# Patient Record
Sex: FEMALE | Race: WHITE
[De-identification: names, ages, dates, MRNs, and addresses within clinical notes are randomized per-mention and may not be internally consistent; named-entity substitution may affect disease eponyms.]

---

## 2017-03-23 NOTE — PCM.PNPP
- General Info


Date of Service: 17


Functional Status: Reports: pain controlled, tolerating diet, ambulating, 

urinating





- Review of Systems


General: Denies: Fever, Weakness


Pulmonary: Denies: shortness of breath


Cardiovascular: Denies: Chest Pain, Lightheadedness


Gastrointestinal: Denies: Abdominal pain, Nausea, Vomiting


Genitourinary: Denies: dysuria, flank pain


Skin: Reports: no symptoms


Psychiatric: Reports: no symptoms





- General Info


Date of Service: 17





- Patient Data


Vital Signs - most recent: 


 Last Vital Signs











Temp  36.9 C   17 07:55


 


Pulse  94   17 07:55


 


Resp  16   17 07:55


 


BP  93/52 L  17 07:55


 


Pulse Ox  96   17 07:55











Weight - most recent: 93.44 kg


Lab Results - last 24 hrs: 


 Laboratory Results - last 24 hr











  17 Range/Units





  04:35 


 


Hgb  10.7 L  (12.0-16.0)  g/dL


 


Hct  32.5 L  (36.0-46.0)  %











Med Orders - Current: 


 Current Medications





Acetaminophen (Tylenol Extra Strength)  500 mg PO Q4H PRN


   PRN Reason: Pain


Acetaminophen (Tylenol Extra Strength)  1,000 mg PO Q4H PRN


   PRN Reason: Pain


Benzocaine/Menthol (Dermoplast Pain Relief 20%-0.5% Spray)  78 gm TOP 

ASDIRECTED PRN


   PRN Reason: Perineal Comfort Measure


   Last Admin: 17 11:05 Dose:  1 can


Bisacodyl (Dulcolax)  10 mg RECTAL .ONCE PRN


   PRN Reason: Constipation


Carboprost Tromethamine (Hemabate Ds)  250 mcg IM ASDIRECTED PRN


   PRN Reason: Post Partum Hemorrhage


Docusate Sodium (Colace)  100 mg PO BID PRN


   PRN Reason: Constipation


   Last Admin: 17 11:06 Dose:  100 mg


Emollient Ointment (Lansinoh Hpa)  0 gm TOP ASDIRECTED PRN


   PRN Reason: Sore Nipples


Lactated Ringer's (Ringers, Lactated)  1,000 mls @ 150 mls/hr IV ASDIRECTED VANNA


   Last Admin: 17 08:43 Dose:  150 mls/hr


Oxytocin/Lactated Ringer's (Pitocin In Lr 30 Units/500 Ml)  30 unit in 500 mls 

@ 2 mls/hr IV TITRATE VANNA; 2 MUNITS/MIN


   PRN Reason: Protocol


   Last Titration: 17 09:11 Dose:  999 mls/hr


Ibuprofen (Motrin)  400 mg PO Q4H PRN


   PRN Reason: Pain


Ibuprofen (Motrin)  800 mg PO Q6H PRN


   PRN Reason: Pain


   Last Admin: 17 17:29 Dose:  800 mg


Methylergonovine Maleate (Methergine)  0.2 mg IM ASDIRECTED PRN


   PRN Reason: Post Partum Hemorrhage


Oxycodone HCl (Oxycodone)  5 mg PO Q2H PRN


   PRN Reason: Pain


Sodium Chloride (Saline Flush)  2.5 ml FLUSH ASDIRECTED PRN


   PRN Reason: Keep Vein Open


Sterile Water (Sterile Water For Irrigation)  1,000 ml IRR ASDIRECTED PRN


   PRN Reason: delivery


Witch Hazel (Tucks)  1 pad TOP ASDIRECTED PRN


   PRN Reason: comfort care


   Last Admin: 17 11:05 Dose:  1 tub





Discontinued Medications





Butorphanol Tartrate (Stadol)  1 mg IVPUSH Q1H PRN


   PRN Reason: Pain


Ampicillin Sodium 2 gm/ Sodium (Chloride)  100 mls @ 200 mls/hr IV ONETIME ONE


   Stop: 17 05:29


   Last Admin: 17 05:41 Dose:  200 mls/hr


Ampicillin Sodium 1 gm/ Sodium (Chloride)  50 mls @ 100 mls/hr IV Q4H VANNA


   Last Admin: 17 09:43 Dose:  Not Given


Lidocaine HCl (Xylocaine 1%)  50 ml INJECT .ONCE PRN


   PRN Reason: Laceration repair


Misoprostol (Cytotec)  200 mcg PO .ONCE PRN


   PRN Reason: Post Partum Hemorrhage


Sodium Chloride (Saline Flush)  10 ml FLUSH ASDIRECTED PRN


   PRN Reason: Keep Vein Open


Terbutaline Sulfate (Brethine)  0.25 mg SUBCUT ASDIRECTED PRN


   PRN Reason: Tacysystole











- Infant Interaction


Infant Feeding:  Infant; Nursed Well


Support Person: 





- Postpartum Recovery Exam


Fundal Tone: Firm


Fundal Level: At Umbilicus


Fundal Placement: Midline


Lochia Amount: Small


Lochia Color: Rubra/Red


Perineum Description: Other (see below)


Other Perinuem Description: Second degree perenial tear,repaired


Episiotomy/Laceration: Approximated


Bladder Status: Voiding


Urinary Elimination: Voided





- Exam


General: alert, oriented


Lungs: Normal respiratory effort


Cardiovascular: Regular Rate, Regular Rhythm


Abdomen: bowel sounds present, soft.  No: CVA tenderness


Extremities: no calf tenderness


Wound/Incisions: healing well


Psy/Mental Status: alert, normal affect





- Problem List & Annotations


(1) Vaginal delivery


SNOMED Code(s): 029719307


   Code(s): O80 - ENCOUNTER FOR FULL-TERM UNCOMPLICATED DELIVERY   Status: 

Acute   Current Visit: Yes   





- Problem List Review


Problem List Initiated/Reviewed/Updated: Yes





- My Orders


Last 24 Hours: 


My Active Orders





17 09:33


Patient Status [ADT] Routine 


May Shower [RC] ASDIRECTED 


Up ad Nancy [RC] ASDIRECTED 


Vital Signs [RC] PER UNIT ROUTINE 


Acetaminophen [Tylenol Extra Strength]   1,000 mg PO Q4H PRN 


Acetaminophen [Tylenol Extra Strength]   500 mg PO Q4H PRN 


Benzocaine/Menthol [Dermoplast Pain Relief 20%-0.5% Spray]   78 gm TOP 

ASDIRECTED PRN 


Bisacodyl [Dulcolax]   10 mg RECTAL .ONCE PRN 


Docusate Sodium [Colace]   100 mg PO BID PRN 


Ibuprofen [Motrin]   400 mg PO Q4H PRN 


Ibuprofen [Motrin]   800 mg PO Q6H PRN 


Lanolin [Lansinoh HPA]   See Dose Instructions  TOP ASDIRECTED PRN 


Witch Hazel [Tucks]   1 pad TOP ASDIRECTED PRN 


oxyCODONE   5 mg PO Q2H PRN 


Assess Lochia [WOMSER] Per Unit Routine 


Assess Uterine Involution [WOMSER] Per Unit Routine 


Peripheral IV Discontinue [OM.PC] Routine 





17 09:34


Ice Therapy [OM.PC] Per Unit Routine 


Perineal Care [OM.PC] Per Unit Routine 


Sitz Bath [OM.PC] Per Unit Routine 





17 09:09


Ready for Discharge [RC] PER UNIT ROUTINE 














- Assessment


Assessment:: 





PPD 1 status post /2nd MLL repaired





- Plan


Plan:: 


Doing well overall, would like to go home today.  Infection and bleeding 

warnings reviewed.  Discharge to home.  Discharge instructions reviewed. Follow 

up at [facility] 6 weeks.

## 2017-03-27 NOTE — OR
SURGEON:

Aretha De Jesus M.D.

 

DATE OF PROCEDURE:  2017

 

PREOPERATIVE DIAGNOSES:

1. A 39 and 6 weeks intrauterine pregnancy.

2. Induction of labor.

 

POSTOPERATIVE DIAGNOSES:

1. A 39 and 6 weeks intrauterine pregnancy.

2. Induction of labor.

 

PROCEDURE:

Spontaneous vaginal delivery, second-degree midline laceration repaired.

 

ESTIMATED BLOOD LOSS:

350 mL.

 

ANESTHESIA:

Local.

 

COMPLICATIONS:

None.

 

FINDINGS:

Term male.  Apgar score 8 at 1 minute, 9 at 5 minutes.  Weight 3380 g.

Spontaneous delivery, intact placenta, 3-vessel cord.

 

DISPOSITION:

Infant to  nursery, mom in LDRP, stable.

 

PROCEDURE DETAILS:

The patient is a 26-year-old, G2, P0-1-0-1, at 39 and 6 weeks' gestational age,

who presents today for scheduled induction of labor due to term gestation lives

remote from the hospital.  Her previous pregnancy was complicated by 

delivery at 28 weeks.  This pregnancy, she has received weekly progesterone

injections up until 36 weeks.

 

On the morning of admission, the patient was initially found to be at 2 cm.

GBBS has been found in her urine culture.  Therefore, she was admitted, routine

labs were drawn, and initiated on ampicillin prophylaxis.

 

The patient responded readily to the Pitocin and shortly after 8:00 a.m., was

found to be increasingly uncomfortable and quite rapidly was already 6 cm.  She

continued to progress rapidly and had spontaneous rupture of membranes and

quickly progressed to complete.  Thereafter, I was called for delivery.  Upon my

arrival, the patient was placed in modified dorsal lithotomy position, prepped

and draped in usual aseptic manner.  Fetal station was already +4.  She pushed

readily and was able to deliver infant's head atraumatically, spontaneously,

followed by anterior shoulder, posterior shoulder, and main body without

difficulty.  Infant's oropharynx and nares were bulb suctioned.  Cord was

clamped x2 and cut.  Infant was handed off to mother with attending nursing

staff at her side.  Cord arterial, cord venous, cord blood samples were

obtained.  Light suprapubic pressure was applied while the placenta was

delivered spontaneously intact.  Vigorous fundal uterine massage was then

applied while 20 units of Picotin was delivered in 1 L of IV fluid.  Upon

inspection of cervix, vaginal sidewalls and perineum, there was found to be a

second-degree midline laceration present.  This was repaired using 3-0 Vicryl in

the usual fashion after prepping the area with approximately 7 mL of 1%

lidocaine.

 

The patient tolerated the repair well.  Upon inspection remainder of cervix,

vaginaol sidewalls and perineum, they were found to be intact.  Uterus remained

firm.  Hemostasis evident.  The patient will remain in LDRP in stable condition.

Infant in  nursery.  Sponge and needle counts correct x2.

 

 

SLAVA / NAHUN

DD:  2017 09:46:42

DT:  2017 13:12:57

Job #:  [id number]

MTDD

## 2020-05-25 NOTE — PCM.PREANE
Preanesthetic Assessment





- Procedure


Proposed Procedure: 





labor epidural








- Anesthesia/Transfusion/Family Hx


Anesthesia History: Prior Anesthesia Without Reaction


Family History of Anesthesia Reaction: No





- Review of Systems


General: No Symptoms


Pulmonary: No Symptoms


Cardiovascular: No Symptoms


Gastrointestinal: No Symptoms


Neurological: No Symptoms


Other: Reports: None





- Physical Assessment


Height: 5 ft 4 in


Weight: 95.254 kg


ASA Class: 2


Mental Status: Alert & Oriented x3


Dentition: Reports: Normal Dentition


Thyro-Mental Finger Breadths: 3


Mouth Opening Finger Breadths: 3


ROM/Head Extension: Full


Lungs: Clear to Auscultation, Normal Respiratory Effort


Cardiovascular: Regular Rate, Regular Rhythm





- Lab


Values: 





 Laboratory Last Values











WBC  10.63 K/uL (4.0-11.0)   05/25/20  22:10    


 


RBC  4.19 M/uL (4.30-5.90)  L  05/25/20  22:10    


 


Hgb  12.3 g/dL (12.0-16.0)   05/25/20  22:10    


 


Hct  37.7 % (36.0-46.0)   05/25/20  22:10    


 


MCV  90.0 fL (80.0-98.0)   05/25/20  22:10    


 


MCH  29.4 pg (27.0-32.0)   05/25/20  22:10    


 


MCHC  32.6 g/dL (31.0-37.0)   05/25/20  22:10    


 


RDW Std Deviation  48.0 fl (28.0-62.0)   05/25/20  22:10    


 


RDW Coeff of Sandro  15 % (11.0-15.0)   05/25/20  22:10    


 


Plt Count  178 K/uL (150-400)   05/25/20  22:10    


 


MPV  11.90 fL (7.40-12.00)   05/25/20  22:10    


 


Nucleated RBC %  0.0 /100WBC  05/25/20  22:10    


 


Nucleated RBCs #  0 K/uL  05/25/20  22:10    














- Allergies


Allergies/Adverse Reactions: 


 Allergies











Allergy/AdvReac Type Severity Reaction Status Date / Time


 


No Known Allergies Allergy   Verified 05/25/20 21:54














- Blood


Product(s) Available: None





- Acknowledgements


Anesthesia Type Planned: Epidural


Pt an Appropriate Candidate for the Planned Anesthesia: Yes


Alternatives and Risks of Anesthesia Discussed w Pt/Guardian: Yes


Pt/Guardian Understands and Agrees with Anesthesia Plan: Yes





PreAnesthesia Questionnaire


OB/GYN History: Reports: Pregnancy





- Past Surgical History


HEENT Surgical History: Reports: Oral Surgery





- HOME MEDS


Home Medications: 


 Home Meds





Pnv No.95/Ferrous Fum/Folic AC [Prenatal Tablet] 1 tab PO DAILY 03/22/17 [

History]











- CURRENT (IN HOUSE) MEDS


Current Meds: 





 Current Medications





Butorphanol Tartrate (Stadol)  1 mg IVPUSH Q1H PRN


   PRN Reason: Pain


Carboprost Tromethamine (Hemabate Ds)  250 mcg IM ASDIRECTED PRN


   PRN Reason: Post Partum Hemorrhage


Ampicillin Sodium 1 gm/ Sodium (Chloride)  50 mls @ 100 mls/hr IV Q4H VANNA


Lactated Ringer's (Ringers, Lactated)  1,000 mls @ 150 mls/hr IV ASDIRECTED VANNA


   Last Admin: 05/25/20 22:59 Dose:  999 mls/hr


Oxytocin/Sodium Chloride (Oxytocin 30 Unit/500 Ml-Ns)  30 unit in 500 mls @ 999 

mls/hr IV TITRATE [facility]


Tranexamic Acid 1,000 mg/ (Sodium Chloride)  110 mls @ 660 mls/hr IV ONETIME PRN


   PRN Reason: Bleeding


Lidocaine HCl (Xylocaine 1%)  50 ml INJECT ONETIME PRN


   PRN Reason: Laceration repair


Methylergonovine Maleate (Methergine)  0.2 mg IM ASDIRECTED PRN


   PRN Reason: Post Partum Hemorrhage


Misoprostol (Cytotec)  200 mcg PO ONETIME PRN


   PRN Reason: Post Partum Hemorrhage


Nalbuphine HCl (Nubain)  10 mg IVPUSH Q1H PRN


   PRN Reason: Pain (severe 7-10)


Ondansetron HCl (Zofran)  4 mg IVPUSH Q6H PRN


   PRN Reason: Nausea/Vomiting


Sodium Chloride (Saline Flush)  10 ml FLUSH ASDIRECTED PRN


   PRN Reason: Keep Vein Open


Sodium Chloride (Saline Flush)  2.5 ml FLUSH ASDIRECTED PRN


   PRN Reason: Keep Vein Open


Sodium Chloride (Normal Saline)  10 ml IV ASDIRECTED PRN


   PRN Reason: IV Use


Sterile Water (Sterile Water For Irrigation)  1,000 ml IRR ASDIRECTED PRN


   PRN Reason: delivery





Discontinued Medications





Ampicillin Sodium 2 gm/ Sodium (Chloride)  100 mls @ 200 mls/hr IV ONETIME ONE


   Stop: 05/25/20 22:59


   Last Admin: 05/25/20 22:19 Dose:  200 mls/hr


Ropivacaine (Naropin 0.2%) Confirm Administered Dose 20 ml .ROUTE .[facility]-MED ONE


   Stop: 05/25/20 23:01

## 2020-05-26 NOTE — PCM.OPNOTE
- General Post-Op/Procedure Note


Date of Surgery/Procedure: 20


Operative Procedure(s): /IP


Findings: 





Viable male APGARs 7, 9 weight 3580 gm. Spontaneous delivery intact placenta 

with 3V cord


Pre Op Diagnosis: 39/5 week IUP.  SROM.  GBBS +


Post-Op Diagnosis: Same


Anesthesia Technique: Epidural


Primary Surgeon: Aretha De Jesus


EBL in mLs: 250


Complications: None known


Condition: Stable


Free Text/Narrative:: 





Dictation [id number]

## 2020-05-26 NOTE — OR
SURGEON:

Aretha De Jesus M.D.

 

DATE OF PROCEDURE:  2020

 

PREOPERATIVE DIAGNOSES:

1. 39 and 5 weeks' intrauterine pregnancy.

2. Spontaneous rupture of membranes.

3. Group B Streptococcus positive.

 

POSTOPERATIVE DIAGNOSES:

1. 39 and 5 weeks' intrauterine pregnancy.

2. Spontaneous rupture of membranes.

3. Group B Streptococcus positive.

 

PROCEDURE:

Spontaneous vaginal delivery, intact perineum.

 

PRIMARY SURGEON:

Aretha De Jesus M.D.

 

ANESTHESIA:

Epidural.

 

ESTIMATED BLOOD LOSS:

250 mL.

 

COMPLICATIONS:

None known.

 

FINDINGS:

Viable male Apgar scores 8 at one minute and 9 at five minutes.  Weight of 3580

g.  Spontaneous delivery, intact placenta, 3-vessel cord.

 

DISPOSITION:

Infant to  nursery.  Mom in LDRP.

 

PROCEDURE DETAILS:

Leora is a 29-year-old, G3, P2 at 39 and 4 weeks' gestational age, who

presented on the evening of 2020 with a leaking fluid, clear.  On initial

examination, she was found to be grossly ruptured.  She is known group B beta

strep positive.  Therefore, she was admitted.  Routine labs were drawn.  IV

hydration was initiated, and group B beta strep prophylaxis was initiated.

 

The fetal heart tones were category 1.  The patient was initially found to be 4

to 5 cm.  After being hydrated, she underwent regional anesthesia in the form of

epidural, became more comfortable, and she continued to progress through the

early morning hours.  Shortly before 5 a.m., the patient was found to be

complete, 100% effaced, +2 station, was called for delivery.  Upon my arrival,

the patient was placed in modified dorsal lithotomy position, was prepped and

draped in the usual aseptic manner.  Began pushing efforts.  With the next 2

contractions, she was able to deliver the infant's head atraumatically

spontaneously, followed by anterior shoulder, posterior shoulder, and remainder

of the body without difficulty.  The infant was handed off to his mother with

attending nursing staff at her side.  After delayed cord was clamped x2 and cut,

cord arterial, cord venous, cord blood sampling were obtained.  Light pressure

was applied while the placenta delivered spontaneously intact.  Vigorous fundal

uterine massage was then applied while 30 units Pitocin was delivered in 500 mL

of fluid.  Upon inspection of cervix, vaginal sidewall, and perineum, these were

found to be intact.  The uterus remained firm.  Sponge count and instrument

count were correct.  The patient remained in LDRP.  Infant to  nursery.

 

 

SLAVA / NAHUN

DD:  2020 05:31:03

DT:  2020 06:14:57

Job #:  [id number]

## 2020-05-26 NOTE — PCM48HPAN
Post Anesthesia Note





- EVALUATION WITHIN 48HRS OF ANESTHETIC


Vital Signs in Normal Range: Yes


Patient Participated in Evaluation: Yes


Respiratory Function Stable: Yes


Airway Patent: Yes


Cardiovascular Function Stable: Yes


Hydration Status Stable: Yes


Pain Control Satisfactory: Yes


Nausea and Vomiting Control Satisfactory: Yes


Mental Status Recovered: Yes


Vital Signs: 


 Last Vital Signs











Temp  36.2 C   05/26/20 08:00


 


Pulse  92   05/26/20 08:00


 


Resp  16   05/26/20 08:00


 


BP  107/78   05/26/20 08:00


 


Pulse Ox  97   05/26/20 08:00














- COMMENTS/OBSERVATIONS


Free Text/Narrative:: 





No anesthesia concerns or complications noted.

## 2020-05-27 NOTE — PCM.PNPP
- General Info


Date of Service: 20


Functional Status: Reports: Pain Controlled, Tolerating Diet, Ambulating, 

Urinating





- Review of Systems


General: Reports: Fatigue.  Denies: Fever, Weakness


Pulmonary: Denies: Shortness of Breath


Cardiovascular: Denies: Chest Pain, Palpitations, Lightheadedness


Gastrointestinal: Denies: Abdominal Pain, Nausea, Vomiting


Genitourinary: Reports: No Symptoms


Musculoskeletal: Reports: No Symptoms


Skin: Reports: No Symptoms


Neurological: Reports: No Symptoms


Psychiatric: Reports: No Symptoms





- General Info


Date of Service: 20





- Patient Data


Vital Signs - Most Recent: 


 Last Vital Signs











Temp  36.3 C   20 20:00


 


Pulse  100   20 04:15


 


Resp  18   20 04:15


 


BP  129/75   20 04:15


 


Pulse Ox  97   20 04:15











Weight - Most Recent: 95.254 kg


Lab Results - Last 24 Hours: 


 Laboratory Results - last 24 hr











  20 Range/Units





  06:10 


 


Hgb  11.1 L  (12.0-16.0)  g/dL


 


Hct  34.6 L  (36.0-46.0)  %











Med Orders - Current: 


 Current Medications





Acetaminophen (Tylenol Extra Strength)  500 mg PO Q4H PRN


   PRN Reason: Pain


Acetaminophen (Tylenol Extra Strength)  1,000 mg PO Q4H PRN


   PRN Reason: Pain


Al Hydroxide/Mg Hydroxide (Mag-Al Plus)  30 ml PO Q8H PRN


   PRN Reason: Heartburn


Benzocaine/Menthol (Dermoplast Pain Relief 20%-0.5% Spray)  78 gm TOP 

ASDIRECTED PRN


   PRN Reason: Perineal Comfort Measure


Bisacodyl (Dulcolax)  10 mg RECTAL ONETIME PRN


   PRN Reason: Constipation


Carboprost Tromethamine (Hemabate Ds)  250 mcg IM ASDIRECTED PRN


   PRN Reason: Post Partum Hemorrhage


Docusate Sodium (Colace)  100 mg PO BID PRN


   PRN Reason: Constipation


Emollient Ointment (Lansinoh Hpa)  0 gm TOP ASDIRECTED PRN


   PRN Reason: Sore Nipples


Lactated Ringer's (Ringers, Lactated)  1,000 mls @ 150 mls/hr IV ASDIRECTED VANNA


   Last Admin: 20 23:36 Dose:  150 mls/hr


Oxytocin/Sodium Chloride (Oxytocin 30 Unit/500 Ml-Ns)  30 unit in 500 mls @ 999 

mls/hr IV TITRATE VANNA


   Last Admin: 20 04:58 Dose:  999 mls/hr


Tranexamic Acid 1,000 mg/ (Sodium Chloride)  110 mls @ 660 mls/hr IV ONETIME PRN


   PRN Reason: Bleeding


Ibuprofen (Motrin)  400 mg PO Q4H PRN


   PRN Reason: Pain


Ibuprofen (Motrin)  800 mg PO Q6H PRN


   PRN Reason: Pain


   Last Admin: 20 10:36 Dose:  800 mg


Lidocaine HCl (Xylocaine 1%)  50 ml INJECT ONETIME PRN


   PRN Reason: Laceration repair


Methylergonovine Maleate (Methergine)  0.2 mg IM ASDIRECTED PRN


   PRN Reason: Post Partum Hemorrhage


Misoprostol (Cytotec)  200 mcg PO ONETIME PRN


   PRN Reason: Post Partum Hemorrhage


Nalbuphine HCl (Nubain)  10 mg IVPUSH Q1H PRN


   PRN Reason: Pain (severe 7-10)


Ondansetron HCl (Zofran)  4 mg IVPUSH Q6H PRN


   PRN Reason: Nausea/Vomiting


Oxycodone HCl (Oxycodone)  5 mg PO Q2H PRN


   PRN Reason: Pain


Sodium Chloride (Saline Flush)  10 ml FLUSH ASDIRECTED PRN


   PRN Reason: Keep Vein Open


Sodium Chloride (Saline Flush)  2.5 ml FLUSH ASDIRECTED PRN


   PRN Reason: Keep Vein Open


Sodium Chloride (Normal Saline)  10 ml IV ASDIRECTED PRN


   PRN Reason: IV Use


Sterile Water (Sterile Water For Irrigation)  1,000 ml IRR ASDIRECTED PRN


   PRN Reason: delivery


   Last Admin: 20 05:24 Dose:  1,000 ml


Witch Hazel (Tucks)  1 pad TOP ASDIRECTED PRN


   PRN Reason: comfort care





Discontinued Medications





Butorphanol Tartrate (Stadol)  1 mg IVPUSH Q1H PRN


   PRN Reason: Pain


Fentanyl/Bupivacaine HCl (Fentanyl-Bupiv-Ns 2 Mcg/Ml-0.125%)  100 ml EPIDUR .[facility]

-MED ONE


   Stop: 20 23:01


Ampicillin Sodium 2 gm/ Sodium (Chloride)  100 mls @ 200 mls/hr IV ONETIME ONE


   Stop: 20 22:59


   Last Admin: 20 22:19 Dose:  200 mls/hr


Ampicillin Sodium 1 gm/ Sodium (Chloride)  50 mls @ 100 mls/hr IV Q4H VANNA


   Last Admin: 20 01:58 Dose:  100 mls/hr


Ropivacaine (Naropin 0.2%) Confirm Administered Dose 20 ml .ROUTE .[facility]-MED ONE


   Stop: 20 23:01


   Last Admin: 20 16:02 Dose:  Not Given











- Infant Interaction


Support Person: 





- Postpartum Recovery Exam


Fundal Tone: Firm


Fundal Level: At Umbilicus


Fundal Placement: Midline


Lochia Amount: Small


Lochia Color: Rubra/Red


Perineum Description: Intact, Minimal Bruising/Swelling


Episiotomy/Laceration: None


Bladder Status: Voiding


Urinary Elimination: Voided





- Exam


General: Alert, Oriented


Lungs: Normal Respiratory Effort


Cardiovascular: Regular Rate, Regular Rhythm


GI/Abdominal Exam: Normal Bowel Sounds, Soft


Extremities: Pedal Edema (trace).  No: Denny's Sign


Skin: Warm, Dry, Intact


Neurological: No New Focal Deficit


Psy/Mental Status: Alert, Normal Affect, Normal Mood





- Problem List & Annotations


(1) Vaginal delivery


SNOMED Code(s): 167215834


   Code(s): O80 - ENCOUNTER FOR FULL-TERM UNCOMPLICATED DELIVERY   Status: 

Acute   Current Visit: No   





- Problem List Review


Problem List Initiated/Reviewed/Updated: Yes





- Assessment


Assessment:: 





PPD 1 status post 





- Plan


Plan:: 





Continue PP cares.  Doing well overall. Pedi may want to monitor baby the 48 

hours given GBBS status positive.  If so, plan discharge in am.  Discharge 

instructions reviewed.

## 2020-05-28 NOTE — PCM.PNPP
- General Info


Date of Service: 20


Functional Status: Reports: Pain Controlled, Tolerating Diet, Ambulating, 

Urinating





- Review of Systems


General: Reports: No Symptoms


HEENT: Reports: No Symptoms


Pulmonary: Reports: No Symptoms


Cardiovascular: Reports: No Symptoms


Gastrointestinal: Reports: No Symptoms


Genitourinary: Reports: No Symptoms


Musculoskeletal: Reports: No Symptoms


Skin: Reports: No Symptoms


Neurological: Reports: No Symptoms


Psychiatric: Reports: No Symptoms





- Patient Data


Vital Signs - Most Recent: 


 Last Vital Signs











Temp  36.4 C   20 04:28


 


Pulse  83   20 04:28


 


Resp  18   20 04:28


 


BP  114/74   20 04:28


 


Pulse Ox  97   20 04:28











Weight - Most Recent: 95.254 kg


Med Orders - Current: 


 Current Medications





Acetaminophen (Tylenol Extra Strength)  500 mg PO Q4H PRN


   PRN Reason: Pain


Acetaminophen (Tylenol Extra Strength)  1,000 mg PO Q4H PRN


   PRN Reason: Pain


Al Hydroxide/Mg Hydroxide (Mag-Al Plus)  30 ml PO Q8H PRN


   PRN Reason: Heartburn


Benzocaine/Menthol (Dermoplast Pain Relief 20%-0.5% Spray)  78 gm TOP 

ASDIRECTED PRN


   PRN Reason: Perineal Comfort Measure


Bisacodyl (Dulcolax)  10 mg RECTAL ONETIME PRN


   PRN Reason: Constipation


Carboprost Tromethamine (Hemabate Ds)  250 mcg IM ASDIRECTED PRN


   PRN Reason: Post Partum Hemorrhage


Docusate Sodium (Colace)  100 mg PO BID PRN


   PRN Reason: Constipation


Emollient Ointment (Lansinoh Hpa)  0 gm TOP ASDIRECTED PRN


   PRN Reason: Sore Nipples


Lactated Ringer's (Ringers, Lactated)  1,000 mls @ 150 mls/hr IV ASDIRECTED [facility]


   Last Admin: 20 23:36 Dose:  150 mls/hr


Oxytocin/Sodium Chloride (Oxytocin 30 Unit/500 Ml-Ns)  30 unit in 500 mls @ 999 

mls/hr IV TITRATE [facility]


   Last Admin: 20 04:58 Dose:  999 mls/hr


Tranexamic Acid 1,000 mg/ (Sodium Chloride)  110 mls @ 660 mls/hr IV ONETIME PRN


   PRN Reason: Bleeding


Ibuprofen (Motrin)  400 mg PO Q4H PRN


   PRN Reason: Pain


Ibuprofen (Motrin)  800 mg PO Q6H PRN


   PRN Reason: Pain


   Last Admin: 20 20:51 Dose:  800 mg


Lidocaine HCl (Xylocaine 1%)  50 ml INJECT ONETIME PRN


   PRN Reason: Laceration repair


Methylergonovine Maleate (Methergine)  0.2 mg IM ASDIRECTED PRN


   PRN Reason: Post Partum Hemorrhage


Misoprostol (Cytotec)  200 mcg PO ONETIME PRN


   PRN Reason: Post Partum Hemorrhage


Nalbuphine HCl (Nubain)  10 mg IVPUSH Q1H PRN


   PRN Reason: Pain (severe 7-10)


Ondansetron HCl (Zofran)  4 mg IVPUSH Q6H PRN


   PRN Reason: Nausea/Vomiting


Oxycodone HCl (Oxycodone)  5 mg PO Q2H PRN


   PRN Reason: Pain


Sodium Chloride (Saline Flush)  10 ml FLUSH ASDIRECTED PRN


   PRN Reason: Keep Vein Open


Sodium Chloride (Saline Flush)  2.5 ml FLUSH ASDIRECTED PRN


   PRN Reason: Keep Vein Open


Sodium Chloride (Normal Saline)  10 ml IV ASDIRECTED PRN


   PRN Reason: IV Use


Sterile Water (Sterile Water For Irrigation)  1,000 ml IRR ASDIRECTED PRN


   PRN Reason: delivery


   Last Admin: 20 05:24 Dose:  1,000 ml


Witch Hazel (Tucks)  1 pad TOP ASDIRECTED PRN


   PRN Reason: comfort care





Discontinued Medications





Butorphanol Tartrate (Stadol)  1 mg IVPUSH Q1H PRN


   PRN Reason: Pain


Fentanyl/Bupivacaine HCl (Fentanyl-Bupiv-Ns 2 Mcg/Ml-0.125%)  100 ml EPIDUR .STK

-MED ONE


   Stop: 20 23:01


Ampicillin Sodium 2 gm/ Sodium (Chloride)  100 mls @ 200 mls/hr IV ONETIME ONE


   Stop: 20 22:59


   Last Admin: 20 22:19 Dose:  200 mls/hr


Ampicillin Sodium 1 gm/ Sodium (Chloride)  50 mls @ 100 mls/hr IV Q4H [facility]


   Last Admin: 20 01:58 Dose:  100 mls/hr


Ropivacaine (Naropin 0.2%) Confirm Administered Dose 20 ml .ROUTE .STK-MED ONE


   Stop: 20 23:01


   Last Admin: 20 16:02 Dose:  Not Given











- Infant Interaction


Infant Disposition, Postpartum:  in Room with Family


Infant Interaction: Holding Infant


Infant Feeding: Attempted Breastfeeding; Nursed Fair/Poor


Support Person: 





- Postpartum Recovery Exam


Fundal Tone: Firm


Fundal Level: 1 Fingerbreadths Below Umbilicus


Fundal Placement: Midline


Lochia Amount: Small


Lochia Color: Rubra/Red


Bladder Status: Voiding


Urinary Elimination: Voided





- Exam


General: Alert, Oriented


Neck: Supple


Lungs: Normal Respiratory Effort


Cardiovascular: Regular Rate, Regular Rhythm


GI/Abdominal Exam: Soft, Non-Tender


Extremities: Non-Tender


Skin: Warm, Dry, Intact


Neurological: No New Focal Deficit


Psy/Mental Status: Alert, Normal Affect, Normal Mood





- Problem List & Annotations


(1) Vaginal delivery


SNOMED Code(s): 921363533


   Code(s): O80 - ENCOUNTER FOR FULL-TERM UNCOMPLICATED DELIVERY   Status: 

Acute   Current Visit: No   





- Problem List Review


Problem List Initiated/Reviewed/Updated: Yes





- My Orders


Last 24 Hours: 


My Active Orders





20 08:04


Ready for Discharge [RC] PER UNIT ROUTINE 














- Assessment


Assessment:: 





PPD 2 status post 





- Plan


Plan:: 





Discharge home today, reviewed discharge instructions. Follow-up in 6 weeks for 

PP visit.

## 2023-02-16 ENCOUNTER — HOSPITAL ENCOUNTER (OUTPATIENT)
Dept: HOSPITAL 56 - MW.SDS | Age: 33
Discharge: HOME | End: 2023-02-16
Attending: SURGERY
Payer: COMMERCIAL

## 2023-02-16 VITALS — DIASTOLIC BLOOD PRESSURE: 65 MMHG | SYSTOLIC BLOOD PRESSURE: 101 MMHG | HEART RATE: 71 BPM

## 2023-02-16 DIAGNOSIS — Z98.890: ICD-10-CM

## 2023-02-16 DIAGNOSIS — Z79.899: ICD-10-CM

## 2023-02-16 DIAGNOSIS — Z12.11: Primary | ICD-10-CM

## 2023-02-16 DIAGNOSIS — Z80.0: ICD-10-CM

## 2023-02-16 PROCEDURE — 45378 DIAGNOSTIC COLONOSCOPY: CPT

## 2023-02-16 PROCEDURE — 81025 URINE PREGNANCY TEST: CPT
